# Patient Record
Sex: FEMALE | Race: WHITE
[De-identification: names, ages, dates, MRNs, and addresses within clinical notes are randomized per-mention and may not be internally consistent; named-entity substitution may affect disease eponyms.]

---

## 2018-06-26 ENCOUNTER — HOSPITAL ENCOUNTER (OUTPATIENT)
Dept: HOSPITAL 92 - ERS | Age: 58
Setting detail: OBSERVATION
LOS: 1 days | Discharge: HOME | End: 2018-06-27
Attending: FAMILY MEDICINE | Admitting: FAMILY MEDICINE
Payer: COMMERCIAL

## 2018-06-26 VITALS — BODY MASS INDEX: 25.6 KG/M2

## 2018-06-26 DIAGNOSIS — Z88.0: ICD-10-CM

## 2018-06-26 DIAGNOSIS — G47.00: ICD-10-CM

## 2018-06-26 DIAGNOSIS — R27.0: ICD-10-CM

## 2018-06-26 DIAGNOSIS — D75.89: ICD-10-CM

## 2018-06-26 DIAGNOSIS — Z79.899: ICD-10-CM

## 2018-06-26 DIAGNOSIS — F17.210: ICD-10-CM

## 2018-06-26 DIAGNOSIS — R51: Primary | ICD-10-CM

## 2018-06-26 DIAGNOSIS — R47.9: ICD-10-CM

## 2018-06-26 DIAGNOSIS — Z88.1: ICD-10-CM

## 2018-06-26 LAB
ALBUMIN SERPL BCG-MCNC: 4.1 G/DL (ref 3.5–5)
ALP SERPL-CCNC: 55 U/L (ref 40–150)
ALT SERPL W P-5'-P-CCNC: 32 U/L (ref 8–55)
ANION GAP SERPL CALC-SCNC: 9 MMOL/L (ref 10–20)
AST SERPL-CCNC: 24 U/L (ref 5–34)
BASOPHILS # BLD AUTO: 0.1 THOU/UL (ref 0–0.2)
BASOPHILS NFR BLD AUTO: 1 % (ref 0–1)
BILIRUB SERPL-MCNC: 0.2 MG/DL (ref 0.2–1.2)
BUN SERPL-MCNC: 10 MG/DL (ref 9.8–20.1)
CALCIUM SERPL-MCNC: 9.4 MG/DL (ref 7.8–10.44)
CHLORIDE SERPL-SCNC: 103 MMOL/L (ref 98–107)
CK MB SERPL-MCNC: 1.8 NG/ML (ref 0–6.6)
CK SERPL-CCNC: 99 U/L (ref 29–168)
CO2 SERPL-SCNC: 28 MMOL/L (ref 22–29)
CREAT CL PREDICTED SERPL C-G-VRATE: 0 ML/MIN (ref 70–130)
EOSINOPHIL # BLD AUTO: 0.2 THOU/UL (ref 0–0.7)
EOSINOPHIL NFR BLD AUTO: 2.9 % (ref 0–10)
GLOBULIN SER CALC-MCNC: 2.9 G/DL (ref 2.4–3.5)
GLUCOSE SERPL-MCNC: 117 MG/DL (ref 70–105)
HGB BLD-MCNC: 13.8 G/DL (ref 12–16)
LYMPHOCYTES # BLD: 2.1 THOU/UL (ref 1.2–3.4)
LYMPHOCYTES NFR BLD AUTO: 34 % (ref 21–51)
MCH RBC QN AUTO: 35.8 PG (ref 27–31)
MCV RBC AUTO: 104 FL (ref 78–98)
MONOCYTES # BLD AUTO: 0.4 THOU/UL (ref 0.11–0.59)
MONOCYTES NFR BLD AUTO: 6 % (ref 0–10)
NEUTROPHILS # BLD AUTO: 3.4 THOU/UL (ref 1.4–6.5)
NEUTROPHILS NFR BLD AUTO: 56.2 % (ref 42–75)
PLATELET # BLD AUTO: 308 THOU/UL (ref 130–400)
POTASSIUM SERPL-SCNC: 4.1 MMOL/L (ref 3.5–5.1)
RBC # BLD AUTO: 3.85 MILL/UL (ref 4.2–5.4)
SODIUM SERPL-SCNC: 136 MMOL/L (ref 136–145)
TROPONIN I SERPL DL<=0.01 NG/ML-MCNC: (no result) NG/ML (ref ?–0.03)
WBC # BLD AUTO: 6.1 THOU/UL (ref 4.8–10.8)

## 2018-06-26 PROCEDURE — 96361 HYDRATE IV INFUSION ADD-ON: CPT

## 2018-06-26 PROCEDURE — 82140 ASSAY OF AMMONIA: CPT

## 2018-06-26 PROCEDURE — 82553 CREATINE MB FRACTION: CPT

## 2018-06-26 PROCEDURE — 80053 COMPREHEN METABOLIC PANEL: CPT

## 2018-06-26 PROCEDURE — 70450 CT HEAD/BRAIN W/O DYE: CPT

## 2018-06-26 PROCEDURE — 84443 ASSAY THYROID STIM HORMONE: CPT

## 2018-06-26 PROCEDURE — 82747 ASSAY OF FOLIC ACID RBC: CPT

## 2018-06-26 PROCEDURE — 86780 TREPONEMA PALLIDUM: CPT

## 2018-06-26 PROCEDURE — 84425 ASSAY OF VITAMIN B-1: CPT

## 2018-06-26 PROCEDURE — 70551 MRI BRAIN STEM W/O DYE: CPT

## 2018-06-26 PROCEDURE — 80061 LIPID PANEL: CPT

## 2018-06-26 PROCEDURE — 85025 COMPLETE CBC W/AUTO DIFF WBC: CPT

## 2018-06-26 PROCEDURE — 84484 ASSAY OF TROPONIN QUANT: CPT

## 2018-06-26 PROCEDURE — 36415 COLL VENOUS BLD VENIPUNCTURE: CPT

## 2018-06-26 PROCEDURE — 93005 ELECTROCARDIOGRAM TRACING: CPT

## 2018-06-26 PROCEDURE — 84436 ASSAY OF TOTAL THYROXINE: CPT

## 2018-06-26 PROCEDURE — 96374 THER/PROPH/DIAG INJ IV PUSH: CPT

## 2018-06-26 PROCEDURE — G0378 HOSPITAL OBSERVATION PER HR: HCPCS

## 2018-06-26 PROCEDURE — 71045 X-RAY EXAM CHEST 1 VIEW: CPT

## 2018-06-26 PROCEDURE — 96375 TX/PRO/DX INJ NEW DRUG ADDON: CPT

## 2018-06-26 PROCEDURE — 80306 DRUG TEST PRSMV INSTRMNT: CPT

## 2018-06-26 PROCEDURE — 82533 TOTAL CORTISOL: CPT

## 2018-06-26 PROCEDURE — 82607 VITAMIN B-12: CPT

## 2018-06-26 NOTE — CT
CT BRAIN:

 

History: 57-year-old with history of weakness, dizziness, headache. 

 

FINDINGS: 

Noncontrast enhanced CT images of the brain obtained. 

 

The brain is unremarkable. No evidence of intracranial masses, hemorrhages, strokes or contusions see
n. Ventricles are of normal size. 

 

IMPRESSION: 

Normal CT brain. 

 

POS: CARL

## 2018-06-26 NOTE — RAD
PORTABLE CHEST 1 VIEW:

 

Date:  06/26/18 

Time:  1433 hours

 

HISTORY:  

Weakness, dizziness, headache, low sodium. 

 

FINDINGS:

The heart size is normal. The lungs are well expanded without lobar consolidation, pneumothoraces, or
 pleural effusions. 

 

IMPRESSION: 

No radiographic evidence of acute cardiopulmonary process. 

 

 

POS: OFF

## 2018-06-26 NOTE — PDOC.FPRHP
- History of Present Illness


Chief Complaint: HA


History of Present Illness: 





Resident: Juhi Emmanuel DO


PCP: [facility]OT provider in [facility]





Patient is a 58yo F with PMH of insomnia who presents with HA x4 days.  Last 

week she was seen in [place] ED for what appears to be olecranon bursitis with 

drainage and at that time and found to have Na of 121 likely 2/2 HCTZ that she 

was taking.  She reports since that time, she has had a dull HA that is located 

at the base of her skull and behind her eyes bilaterally.  She denied N/V and 

photophobia.  She has a hx of migraines and takes Rizatriptan but did not try 

this medication before coming in.  At the time of my interview, her HA is all 

but resolved and has no other complaints although per ER staff, she reported 

some trouble walking and their exam revealed truncal ataxia vs limb ataxia, 

tremulousness, and possible facial droop (per nurse).   


ED Course: 





Patient received Benadryl, Toradol, Reglan, ASA, and Ativan. 





- Allergies/Adverse Reactions


 Allergies











Allergy/AdvReac Type Severity Reaction Status Date / Time


 


doxycycline Allergy   Verified 06/27/18 00:48


 


Penicillins Allergy   Verified 06/27/18 00:48














- Home Medications


 











 Medication  Instructions  Recorded  Confirmed  Type


 


Estradiol 1 tab PO DAILY 06/27/18 06/27/18 History


 


Hydrochlorothiazide 1 tab PO DAILY 06/27/18 06/27/18 History


 


Sulfamethoxazole/Trimethoprim 1 tab PO BID 06/27/18 06/27/18 History





[Sulfamethoxazole/TMP DS]    


 


traZODone HCl [Trazodone HCl] 0.5 tab PO HS 06/27/18 06/27/18 History














- History


PMHx:


1. Insomnia


2. Hx of LE swelling


3. Migraine HA


 


PSHx: 


none





FHx:


none


 


Social:


Smokes 1/2 ppd for 30 years, drinks 1-2 glasses of wine per week, and denies 

drug use. 


 








- Review of Systems


General: denies: fever/chills, weight/appetite/sleep changes


Eyes: reports: vision changes (Reports having to use a stronger prescription 

lately, not an acute change).  denies: eye pain


ENT: denies: nasal congestion, rhinorrhea


Respiratory: denies: cough, congestion, shortness of breath


Cardiovascular: denies: chest pain, palpitation, edema


Gastrointestinal: denies: nausea, vomiting, diarrhea, constipation, abdominal 

pain


Genitourinary: denies: incontinence, dysuria, polyuria


Skin: denies: rashes, lesions, jaundice


Musculoskeletal: denies: pain, tenderness, stiffness, swelling


Neurological: denies: numbness, syncope, seizure


Psychological: denies: anxiety, depression





- Vital signs


BP: 154/104  HR: 76 RR: 20 Tmax: 98.3 Pox: 97% on RA  Wt:    








- Physical Exam


Constitutional: NAD, awake, alert and oriented, well developed


HEENT: normocephalic and atraumatic, PERRLA, EOMI, grossly normal vision, 

grossly normal hearing, MMM


-HEENT: 





poor dentition


Neck: supple, no LAD


Heart: RRR, normal S1/S2, no murmurs/rubs/gallops


Lungs: CTAB, no respiratory distress, no wheezing


Abdomen: soft, non-tender, bowel sounds present


Musculoskeletal: normal structure, normal tone, ROM grossly normal


Neurological: no focal deficit, CN II-XII intact, normal sensation


-Neurological: 





strength 5/5 in UE and LE, normal gait


Skin: no rash/lesions, good turgor


Heme/Lymphatic: no unusual bruising or bleeding, no purpura


Psychiatric: normal mood and affect, intact recent and remote memory





FMR H&P: Results





- Labs


Result Diagrams: 


 06/26/18 15:06





 06/26/18 15:06


Lab results: 


 











WBC  6.1 thou/uL (4.8-10.8)   06/26/18  15:06    


 


Hgb  13.8 g/dL (12.0-16.0)   06/26/18  15:06    


 


Hct  40.2 % (36.0-47.0)   06/26/18  15:06    


 


MCV  104.0 fL (78.0-98.0)  H  06/26/18  15:06    


 


Plt Count  308 thou/uL (130-400)   06/26/18  15:06    


 


Neutrophils %  56.2 % (42.0-75.0)   06/26/18  15:06    


 


Sodium  136 mmol/L (136-145)   06/26/18  15:06    


 


Potassium  4.1 mmol/L (3.5-5.1)   06/26/18  15:06    


 


Chloride  103 mmol/L ()   06/26/18  15:06    


 


Carbon Dioxide  28 mmol/L (22-29)   06/26/18  15:06    


 


BUN  10 mg/dL (9.8-20.1)   06/26/18  15:06    


 


Creatinine  0.82 mg/dL (0.6-1.1)   06/26/18  15:06    


 


Glucose  117 mg/dL ()  H  06/26/18  15:06    


 


Calcium  9.4 mg/dL (7.8-10.44)   06/26/18  15:06    


 


Total Bilirubin  0.2 mg/dL (0.2-1.2)   06/26/18  15:06    


 


AST  24 U/L (5-34)   06/26/18  15:06    


 


ALT  32 U/L (8-55)   06/26/18  15:06    


 


Alkaline Phosphatase  55 U/L ()   06/26/18  15:06    


 


Creatine Kinase  99 U/L ()   06/26/18  15:06    


 


CK-MB (CK-2)  1.8 ng/mL (0-6.6)   06/26/18  15:06    


 


Serum Total Protein  7.0 g/dL (6.0-8.3)   06/26/18  15:06    


 


Albumin  4.1 g/dL (3.5-5.0)   06/26/18  15:06    














- Radiology Interpretation


  ** Chest x-ray


Status: report reviewed by me


Additional comment: 





no acute findings





  ** CT scan - head


Status: image reviewed by me, report reviewed by me


Additional comment: 





no acute findings





FMR H&P: A/P





- Problem List


(1) Headache


Current Visit: Yes   Status: Acute   Code(s): R51 - HEADACHE   





(2) Insomnia


Current Visit: Yes   Status: Acute   Code(s): G47.00 - INSOMNIA, UNSPECIFIED   





(3) Hx of migraine headaches


Current Visit: Yes   Status: Acute   Code(s): Z86.69 - PERSONAL HISTORY OF DIS 

OF THE NERVOUS SYS AND SENSE ORGANS   





(4) Ataxia


Current Visit: Yes   Status: Acute   Code(s): R27.0 - ATAXIA, UNSPECIFIED   





(5) Macrocytosis


Current Visit: Yes   Status: Acute   Code(s): D75.89 - OTHER SPECIFIED DISEASES 

OF BLOOD AND BLOOD-FORMING ORGANS   





- Plan





Headache 2/2 Migraine vs Posterior CVA


- With ER physicians noting ataxia and tremulousness, concern for CVA, although 

patients HA and sx resolved with migraine cocktail.  Place in observation. 


- MRI to r/o CVA


- allow for permissive HTN


- NIH scores


- Thiamine pending d/t hx of ataxia


- Reglan, Toradol, and Benadryl combo for prn use for HA





Macrocytosis


- RBC folate, B12 pending





Hx of Migraines


- uses Rizatriptan at home





Hx of Hyponatremia


- resolved, Na wnl today





Insomnia


- continue trazodone and benadryl prn





VTE ppx: SCD's


Code Status: Full





Dispo: Likely <48h. 





FMR H&P: Upper Level





- Plan


Date/Time: 06/26/18 2103





PCP- [facility]





Pt is 58 yo F w/ PMH of migraines who presented to the ER with reported 

symptoms of headache and ataxia yet to be defined exactly what the ataxia was 

as there was no documentation done by the ER provider. ER provider gave her a 

headache cocktail which consisted of reglan, toradol and Benadryl and by the 

time we saw the patient she was completely asymptomatic. She states she has hx 

of migraine but didnt take her rizatriptan at home because she says this 

headache felt different from a migraine. Reported hx of hyponatremia at outside 

ER she went to last week, but today 136, was taking hctz, no longer taking. We 

did try to discharge the patient home from the ED for resolved migraine however 

the ED physician taking over care had concern for posterior CVA with hx of 

ataxia. Still it is unclear what exactly was ataxic, one nurse reported facial 

droop, ER physician reported truncal ataxia and original provider reported 

ataxia of the limbs. At time of our exam, patients headache resolved, 

completely normal neuro exam, no ataxia with walking, no coordination issues. 

Patient actually wanting to go home.





General: AOx3, appears in no acute distress, appropriately dressed, groomed, 

appears approximate stated age


HEENT: moist mucus membranes, no pharyngeal edema


Cardiac: RRR, no murmurs, gallops, clicks or rubs


Lungs: CTA, no wheezes, rales, rubs, rhonchi


Abdomen: soft, non TTP, normoactive bs, no abdominal mass or pulsation


Extremities: No TTP, no swelling, cyanosis, no pitting edema


Neuro: CN II-XII intact, 5/5 , UE and LE strength, no decrease in sensation

, no gait disturbance





A/P


1.   Migraine vs posterior CVA-r/o CVA with MRI in the morning, treat migraine 

symptoms o/n. D/c if MRI negative tomorrow


2. Macrocytosis-ordered RBC folate and B12, possibly related to reported ataxia 

(?)





I, Tonia Bush, have evaluated this patient and agree with findings/plan as 

outlined by intern resident, Dr. Emmanuel. Pertinent changes/additions are listed 

here.








Attending Addendum





- Attending Addendum


Date/Time: 06/27/18 0615; seen on 6/26 @ ~ 2000.





I personally evaluated the patient and discussed the management with Dr. Emmanuel 

and Eleazar.


I agree with and repeated the History, Examination, Assessment and Plan 

documented above with any addition or exceptions noted below.





Pt with gradual onset headache (moderate-severe, bitemporal and radiating 

across head and down into shoulder muscles with no n/v/vision changes and does 

not reportedly feel like her usual migraines) and not feeling herself, with 

subjective word finding difficulties, unsteadiness, and tremulousness.  Exam 

only positive for mild head tremor and hesitant, but accurate, finger to nose 

with normal heel-to-shin.  Extensive debate in the ED concerning admission.  

Provider who reported ataxia during handoff had not completed documentation.  I 

spoke with physician who had seen patient initially and he stated she had 

pronounced truncal and head tremulousness that made him concerned for a 

posterior stroke.  In light of the varying exam, lack of documentation, will 

admit for TIA/stroke ruleout.  It is certainly possible that she had a stroke 

initially when at [facility], incidentally normal exam of her left olecranon, and 

now with persistent symptoms.  Her ddx includes SAH, central pontine 

myelinolysis, essential tremor, parkinsonism, psychosomatic, etc.

## 2018-06-27 VITALS — TEMPERATURE: 98.1 F | SYSTOLIC BLOOD PRESSURE: 130 MMHG | DIASTOLIC BLOOD PRESSURE: 68 MMHG

## 2018-06-27 LAB
CHD RISK SERPL-RTO: 3.4 (ref ?–4.5)
CHOLEST SERPL-MCNC: 153 MG/DL
DRUG SCREEN CUTOFF: (no result)
HDLC SERPL-MCNC: 45 MG/DL
LDLC SERPL CALC-MCNC: 95 MG/DL
MEDTOX CONTROL LINE VALID?: (no result)
MEDTOX READER #: (no result)
SYPHILIS ANTIBODY INDEX: 0.11 S/CO
TRIGL SERPL-MCNC: 67 MG/DL (ref ?–150)

## 2018-06-27 NOTE — PDOC.FM
- Subjective


Subjective: 





56 yo F here with concern of possible posterior TIA/CVA. Pt is resting 

comfortably this morning. She states that all symptoms have resolved and denies 

continued headache or trouble walking. There were no acute events over night. 





- Objective


MAR Reviewed: Yes


Vital Signs & Weight: 


 Vital Signs (12 hours)











  Temp Pulse Resp BP Pulse Ox


 


 06/27/18 07:50  97.9 F  73  16  106/59 L  95


 


 06/27/18 04:00  97.7 F  91  18  109/55 L  98


 


 06/27/18 00:00  97.7 F  79  18  115/61  95


 


 06/26/18 22:13  97.7 F  79  18  134/74  97








 Weight











Weight                         81.102 kg














I&O: 


 











 06/26/18 06/27/18 06/28/18





 06:59 06:59 06:59


 


Intake Total  240 


 


Balance  240 











Result Diagrams: 


 06/26/18 15:06





 06/26/18 15:06





<Dwight Ferrell - Last Filed: 06/27/18 09:12>





- Objective


Vital Signs & Weight: 


 Vital Signs (12 hours)











  Temp Pulse Resp BP Pulse Ox


 


 06/27/18 08:59  97.9 F  73  16  


 


 06/27/18 07:50  97.9 F  73  16  106/59 L  95


 


 06/27/18 04:00  97.7 F  91  18  109/55 L  98


 


 06/27/18 00:00  97.7 F  79  18  115/61  95








 Weight











Weight                         81.102 kg














I&O: 


 











 06/26/18 06/27/18 06/28/18





 06:59 06:59 06:59


 


Intake Total  240 


 


Balance  240 











Result Diagrams: 


 06/26/18 15:06





 06/26/18 15:06





<Anjum Hobbs - Last Filed: 06/27/18 11:33>





Phys Exam





- Physical Examination


Constitutional: NAD


HEENT: moist MMs, sclera anicteric


Neck: no JVD, full ROM


Respiratory: clear to auscultation bilateral


Cardiovascular: RRR, no significant murmur


Gastrointestinal: soft, non-tender, no distention, positive bowel sounds


Musculoskeletal: no edema


Neurological: non-focal, normal sensation, moves all 4 limbs


Normal strength. Normal cerebellar testing


Psychiatric: normal affect, A&O x 3


Skin: no rash





<Dwight Ferrell - Last Filed: 06/27/18 09:12>





Dx/Plan


(1) Ataxia


Code(s): R27.0 - ATAXIA, UNSPECIFIED   Status: Resolved   





(2) Headache


Code(s): R51 - HEADACHE   Status: Resolved   


  QualifierTitle:    Headache type: unspecified   Headache chronicity pattern: 

unspecified pattern   Intractability: not intractable   Qualified Code(s): R51 

- Headache   





(3) Insomnia


Code(s): G47.00 - INSOMNIA, UNSPECIFIED   Status: Chronic   


  QualifierTitle:    Insomnia type: primary   Qualified Code(s): F51.01 - 

Primary insomnia   





(4) Macrocytosis


Code(s): D75.89 - OTHER SPECIFIED DISEASES OF BLOOD AND BLOOD-FORMING ORGANS   

Status: Acute   





- Plan


Plan: 





Headache 2/2 Migraine vs Posterior CVA


- With ER physicians noting ataxia and tremulousness, there was initially 

concern for CVA. All symptoms have since resolved


- This is possibly psychosomatic. Will work to rule out organic causes. MRI to 

be completed today. TSH and B12 WNL. Folate and Thiamine pending. Syphilis IgG/

M negative 


- Reglan, Toradol, and Benadryl combo for prn use for HA





Macrocytosis


- RBC folate, B12 pending





Hx of Migraines


- uses Rizatriptan at home





Hx of Hyponatremia


- resolved, Na wnl today





Insomnia


- continue trazodone and benadryl prn





Dispo: stable, likely dc today or in am pending results of MRI and labs. 








<Dwight Ferrell - Last Filed: 06/27/18 09:12>





Attending Addendum





- Attending Addendum


Date/Time: 06/27/18 1129





I personally evaluated the patient and discussed the management with Dr. Ferrell


I agree with the History, Examination, Assessment and Plan documented above 

with any addition or exceptions noted below.Patient resting comfortably MRI 

pending given history of prior vascular headaches concern for basilar type 

migraine given symptoms especially if MRI negative  avoid triptans and 

conssider treeatment CCB.i.e verapamil or topamax prophylaxis.








<Anjum Hobbs - Last Filed: 06/27/18 11:33>

## 2018-06-27 NOTE — MRI
NONCONTRAST MRI BRAIN:

 

06/27/2018

 

HISTORY:

Weakness, dizziness, and headache.

 

FINDINGS:

There are a few scattered punctate areas of increased FLAIR and T2 weighted signal intensity seen wit
hin the periventricular white matter, predominantly involving the right cerebral hemisphere, which ar
e overall nonspecific, but probably attributable to mild chronic small vessel ischemic changes.  Ther
e is no evidence of an acute infarction.  The septum pellucidum and third ventricle are in the midlin
e.  The ventricular system is normal in size, shape, and position.

 

Appropriate flow voids are demonstrated at the base of the brain.

 

The orbits and the skull base have a normal MRI appearance.  Minimal mucosal thickening is seen in ea
ch maxillary antrum.

 

IMPRESSION:

1.  No acute intracranial abnormalities demonstrated.

 

2.  Evidence for mild chronic small vessel ischemic changes.

 

POS: CARL

## 2018-06-27 NOTE — CON
DATE OF CONSULTATION:  06/27/2018

 

REFERRING PHYSICAN:  Hospitalist service.

 

IMPRESSION:  Abnormal speech pattern suggesting of either a psychogenic etiology or possibly some typ
e of metabolic disturbance.

 

PLAN:  Cortisol level, ammonia level, T4 level.

 

Ms. Emmanuel is a 57-year-old white female with a past history of migraine headaches.  She was recentl
y hospitalized for some hyponatremia after developing some slowness of her speech.  Sodium was correc
ashanti with oral intake.  She was discharged 2 days later.  She returned home and continued to experienc
e the symptoms of having difficulty eating, smooth fluent speech.  She understands things well.  She 
does report that she feels like she is in a bit of a brain fog.  She had a headache yesterday, but no
ne today.  She was admitted for further evaluation.  Her MRI of the brain showed some minimal small v
essel ischemic changes.  Her lab work was unremarkable, and from the standpoint of a CBC and chemistr
y panel, she has no lateralized complaints.  She denies any psychiatric history.  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  She denies that the job has been stressful.  She does 
not report any family or other difficulties at home causing her stress.

 

PAST MEDICAL HISTORY:  Migraine.

 

ALLERGIES:  DOXYCYCLINE, PENICILLIN.

 

SOCIAL HISTORY:  Unremarkable.

 

FAMILY HISTORY:  Unremarkable.

 

REVIEW OF SYSTEMS:  No lateralized weakness or numbness.

 

PHYSICAL EXAMINATION:

GENERAL:  She is a healthy-appearing middle-aged woman lying in bed in no distress.

VITAL SIGNS:  Stable.  She is afebrile.

HEENT:  Pupils are equal and reactive.  Conjunctivae clear.  Oropharynx clear.

NEUROLOGIC EXAM:  She is alert and cooperative.  Her speech is fluent and clear.  The prosody of her 
speech is a bit broken with intermittent pauses.  There is no dysarthria.  Cranial nerves are intact.
  Motor exam is without any focal weakness.  No abnormal movements were seen.  She has been able to w
alk independently.

 

SUMMARY:  A middle-aged woman with abnormal speech pattern that appears more consistent with a psycho
genic etiology.  Other metabolic issues are possible, albeit a bit rare.  We will proceed with furthe
r lab work and might consider referral to Psychiatry for evaluation.

## 2018-06-28 LAB
FOLATE RBC-MCNC: 1184 NG/ML (ref 498–?)
HCT VFR BLD CALC: 38.5 % (ref 34–46.6)

## 2018-06-28 NOTE — DIS-2
DATE OF ADMISSION:  06/26/2018

 

DATE OF DISCHARGE:  06/27/2018

 

RESIDENT:  Dwight Ferrell DO

 

ADMITTING ATTENDING:  Leroy Webster MD

 

DISCHARGE ATTENDING:  Anjum Hobbs MD

 

CONSULTATIONS:  Mekhi Mayorga M.D., Neurology.

 

PROCEDURES:  Brain CT on 06/26 unremarkable, normal brain CT.  Chest x-ray on 06
/26 has no radiographic evidence of acute cardiopulmonary process.  Brain MRI 
on 06/27/2018,  impression, no acute intracranial abnormalities, demonstrated 
chronic small vessel ischemic changes.

 

DISCHARGE MEDICATIONS:  Bactrim-DS 1 tab p.o. b.i.d., estradiol 1 tab p.o. daily
, trazodone 75 mg p.o. at bedtime, hydrochlorothiazide 12.5 mg p.o. daily, 
aspirin 81 mg p.o. daily, Pristiq 50 mg p.o. daily, verapamil 80 mg p.o. t.i.d.

 

HOSPITAL COURSE:  This is a 57-year-old female who was admitted for concern of 
TIA versus CVA. Her initial complaint was a headache that started behind her 
left eye and progressed to involve her entire head and eventually had 
associated left sided weakness. After being seen in the ER she was found to 
have truncal ataxia. She was given treatment for her migraine to include Reglan
, Benadryl, and Toradol. This significantly improved her symptoms. Workup in 
the emergency room included a brain CT and then the next day a brain MRI, this 
found no cause for ataxia.  Neurology was consulted on the case and felt that 
this was much more likely to be a psychogenic etiology There were no 
significant lab abnormalities with the exception of positive opiate and 
positive benzodiazepine on UDS.  She was found to be over the night anemic, she 
was macrocytic; however, a vitamin B12 was normal and RBC, folate was also 
normal.  Throughout the course of observation, patient had normal vital signs 
and at the time of evaluation on the morning after initial admission, the 
patient was back to her normal baseline.  There was apparently one episode of 
shaking that was seen; however, this spontaneously resolved.  It was very 
likely that this is all psychogenic as the patient does state that she has 
significant increase in stress level as of late and was recommend the patient 
will followup in outpatient setting for continued monitoring and will be 
referred to Neurology for followup.

 

DISPOSITION:  Stable.

 

DISCHARGE INSTRUCTIONS:

1.  Location:  Home.

2.  Diet:  Regular.

3.  Activity:  Ad kelsy.

4.  Followup:  With her PCP within 1 week.  Follow up with Dr. Mekhi Mayorga 
within a week.

 

KALEB

## 2018-06-30 NOTE — EKG
Test Reason : 

Blood Pressure : ***/*** mmHG

Vent. Rate : 079 BPM     Atrial Rate : 079 BPM

   P-R Int : 148 ms          QRS Dur : 080 ms

    QT Int : 382 ms       P-R-T Axes : 000 036 052 degrees

   QTc Int : 438 ms

 

Normal sinus rhythm

Low voltage QRS

Borderline ECG

No ST elevation/MI

 

Confirmed by KORINA LEMA ([id number]),  LETY JEAN ([id number]) on 6/30/2018 3:04:05 PM

 

Referred By:             Confirmed By:KORINA LEMA